# Patient Record
Sex: FEMALE | Race: WHITE | Employment: UNEMPLOYED | URBAN - NONMETROPOLITAN AREA
[De-identification: names, ages, dates, MRNs, and addresses within clinical notes are randomized per-mention and may not be internally consistent; named-entity substitution may affect disease eponyms.]

---

## 2017-12-27 ENCOUNTER — HOSPITAL ENCOUNTER (OUTPATIENT)
Age: 27
Discharge: HOME | End: 2017-12-27
Payer: COMMERCIAL

## 2017-12-27 ENCOUNTER — OFFICE VISIT (OUTPATIENT)
Dept: FAMILY MEDICINE CLINIC | Age: 27
End: 2017-12-27
Payer: COMMERCIAL

## 2017-12-27 VITALS
DIASTOLIC BLOOD PRESSURE: 87 MMHG | BODY MASS INDEX: 25.17 KG/M2 | HEIGHT: 66 IN | RESPIRATION RATE: 10 BRPM | HEART RATE: 79 BPM | SYSTOLIC BLOOD PRESSURE: 127 MMHG | OXYGEN SATURATION: 99 % | TEMPERATURE: 97.9 F | WEIGHT: 156.6 LBS

## 2017-12-27 DIAGNOSIS — G47.9 SLEEP DIFFICULTIES: ICD-10-CM

## 2017-12-27 DIAGNOSIS — R19.7 DIARRHEA DUE TO MALABSORPTION: ICD-10-CM

## 2017-12-27 DIAGNOSIS — R52 ACHES: ICD-10-CM

## 2017-12-27 DIAGNOSIS — R53.83 TIRED: Primary | ICD-10-CM

## 2017-12-27 DIAGNOSIS — R79.89 D-DIMER, ELEVATED: ICD-10-CM

## 2017-12-27 DIAGNOSIS — K90.9 DIARRHEA DUE TO MALABSORPTION: ICD-10-CM

## 2017-12-27 LAB
AVERAGE GLUCOSE: 93 MG/DL (ref 70–126)
BASOPHILS # BLD: 3.6 %
BASOPHILS ABSOLUTE: 0.2 THOU/MM3 (ref 0–0.1)
CALCIUM SERPL-MCNC: 9.8 MG/DL (ref 8.5–10.5)
D-DIMER QUANTITATIVE: 371 NG/ML FEU (ref 0–500)
EOSINOPHIL # BLD: 4.1 %
EOSINOPHILS ABSOLUTE: 0.3 THOU/MM3 (ref 0–0.4)
ESTRADIOL LEVEL: 22 PG/ML
FOLLICLE STIMULATING HORMONE: 6.8 MIU/ML (ref 0.6–16.9)
HBA1C MFR BLD: 5.1 % (ref 4.4–6.4)
HCT VFR BLD CALC: 37.5 % (ref 37–47)
HEMOGLOBIN: 12.9 GM/DL (ref 12–16)
IGE: 7 IU/ML
LUTEINIZING HORMONE: 6 MIU/ML (ref 0.6–43.9)
LYMPHOCYTES # BLD: 21.7 %
LYMPHOCYTES ABSOLUTE: 1.5 THOU/MM3 (ref 1–4.8)
MAGNESIUM: 2.1 MG/DL (ref 1.6–2.4)
MCH RBC QN AUTO: 30.3 PG (ref 27–31)
MCHC RBC AUTO-ENTMCNC: 34.3 GM/DL (ref 33–37)
MCV RBC AUTO: 88.3 FL (ref 81–99)
MONOCYTES # BLD: 5.3 %
MONOCYTES ABSOLUTE: 0.4 THOU/MM3 (ref 0.4–1.3)
NUCLEATED RED BLOOD CELLS: 0 /100 WBC
PDW BLD-RTO: 12.6 % (ref 11.5–14.5)
PLATELET # BLD: 274 THOU/MM3 (ref 130–400)
PMV BLD AUTO: 9.1 MCM (ref 7.4–10.4)
PROGESTERONE LEVEL: < 0.05 NG/ML
PTH INTACT: 39.6 PG/ML (ref 15–65)
RBC # BLD: 4.25 MILL/MM3 (ref 4.2–5.4)
RHEUMATOID FACTOR: < 10 IU/ML (ref 0–13)
SEDIMENTATION RATE, ERYTHROCYTE: 13 MM/HR (ref 0–20)
SEG NEUTROPHILS: 65.3 %
SEGMENTED NEUTROPHILS ABSOLUTE COUNT: 4.4 THOU/MM3 (ref 1.8–7.7)
T3 FREE: 3.18 PG/ML (ref 2.02–4.43)
T3 TOTAL: 88 NG/DL (ref 72–181)
THYROXINE (T4): 5.8 UG/DL (ref 4.5–12)
TSH SERPL DL<=0.05 MIU/L-ACNC: 2.45 UIU/ML (ref 0.4–4.2)
VITAMIN D 25-HYDROXY: 18 NG/ML (ref 30–100)
WBC # BLD: 6.7 THOU/MM3 (ref 4.8–10.8)

## 2017-12-27 PROCEDURE — 99204 OFFICE O/P NEW MOD 45 MIN: CPT | Performed by: FAMILY MEDICINE

## 2017-12-27 PROCEDURE — G8427 DOCREV CUR MEDS BY ELIG CLIN: HCPCS | Performed by: FAMILY MEDICINE

## 2017-12-27 PROCEDURE — G8419 CALC BMI OUT NRM PARAM NOF/U: HCPCS | Performed by: FAMILY MEDICINE

## 2017-12-27 PROCEDURE — G8484 FLU IMMUNIZE NO ADMIN: HCPCS | Performed by: FAMILY MEDICINE

## 2017-12-27 PROCEDURE — 1036F TOBACCO NON-USER: CPT | Performed by: FAMILY MEDICINE

## 2017-12-27 ASSESSMENT — PATIENT HEALTH QUESTIONNAIRE - PHQ9
SUM OF ALL RESPONSES TO PHQ9 QUESTIONS 1 & 2: 0
2. FEELING DOWN, DEPRESSED OR HOPELESS: 0
SUM OF ALL RESPONSES TO PHQ QUESTIONS 1-9: 0
1. LITTLE INTEREST OR PLEASURE IN DOING THINGS: 0

## 2017-12-27 ASSESSMENT — ENCOUNTER SYMPTOMS
ALLERGIC/IMMUNOLOGIC NEGATIVE: 1
NAUSEA: 1
ABDOMINAL PAIN: 1
RESPIRATORY NEGATIVE: 1
ABDOMINAL DISTENTION: 1
DIARRHEA: 1

## 2017-12-27 NOTE — PROGRESS NOTES
Subjective:      Patient ID: Yadira Lee is a 32 y.o. female. HPI   Yadira Lee is a 32 y.o. White female. Latonya  presents to the [address] today for   Chief Complaint   Patient presents with   David Edmondson Doctor     Wee Protocol    Insomnia     1 year ago saw nutitionist, helped a bit with all 3 phases of sleep    Joint Pain     knees, hips- since had twins; right leg and foot tingle after 20 min drive; D-dimer elevated    Diarrhea     as long as she can remember    Food Intolerance     preservatives, citric acid, additives, non GMO, organic    Fatigue    Insomnia   ,  and;   Tired    Diarrhea due to malabsorption    Sleep difficulties    Aches    D-dimer, elevated      I have reviewed Yadira Lee medical, surgical and other pertinent history in detail, and have updated medication and allergy information in the computerized patient record. Clinical Care Team:     -Referring Provider for today's consult: Self Referred  -Primary Care Provider: Marie Pham    Medical/Surgical History:   She  has a past medical history of GERD (gastroesophageal reflux disease). Her  has a past surgical history that includes Cholecystectomy and  section (2016). Family/Social History:     Her family history includes High Blood Pressure in her father; No Known Problems in her mother and sister. She  reports that she has never smoked. She has never used smokeless tobacco. She reports that she drinks alcohol. She reports that she does not use drugs. Medications/Allergies/Immunizations:     Her current medication(s) include   Current Outpatient Prescriptions:     NONFORMULARY, Cholacol- 1 tablet with every meal Muscle ezze pm- 3 tablets before bed, Disp: , Rfl:   Allergies: Septra [sulfamethoxazole-trimethoprim],  Immunizations: There is no immunization history on file for this patient.      History of Present Illness:     Nicole's had concerns including Established New Doctor [facility] Protocol); Insomnia (1 year ago saw pierreist, helped a bit with all 3 phases of sleep); Joint Pain (knees, hips- since had twins; right leg and foot tingle after 20 min drive; D-dimer elevated); Diarrhea (as long as she can remember); Food Intolerance (preservatives, citric acid, additives, non GMO, organic); Fatigue; and Insomnia. Pepito Alcantara  presents to the [address] today for;   Chief Complaint   Patient presents with   Terese Due New Doctor     Beverly Protocol    Insomnia     1 year ago saw pierreist, helped a bit with all 3 phases of sleep    Joint Pain     knees, hips- since had twins; right leg and foot tingle after 20 min drive; D-dimer elevated    Diarrhea     as long as she can remember    Food Intolerance     preservatives, citric acid, additives, non GMO, organic    Fatigue    Insomnia   , ,  abnormal labs follow up and these conditions as she  Is looking today for:     Tired    Diarrhea due to malabsorption    Sleep difficulties    Aches    D-dimer, elevated          Review of Systems   Constitutional: Positive for fatigue and unexpected weight change. HENT: Negative. Eyes: Positive for visual disturbance. Respiratory: Negative. Cardiovascular: Negative. Gastrointestinal: Positive for abdominal distention, abdominal pain, diarrhea and nausea. Endocrine: Negative. Genitourinary: Positive for frequency. Musculoskeletal: Positive for arthralgias, gait problem and neck pain. Skin: Negative. Allergic/Immunologic: Negative. Neurological: Positive for numbness. Hematological: Negative. Psychiatric/Behavioral: Positive for sleep disturbance. All other systems reviewed and are negative. Objective:   Physical Exam   Constitutional: She is oriented to person, place, and time. She appears well-developed and well-nourished. HENT:   Head: Normocephalic.    Pulmonary/Chest: Effort normal.   Neurological: She is alert and oriented to person, place, and time. Psychiatric: She has a normal mood and affect. Thought content normal.   Nursing note and vitals reviewed. Assessment:     Laboratory Data:   Lab results were searched in Care Everywhere and/or those brought by the pateint were reviewed today with Aleah Gannon and she has a copy of their most recent labs to take home with them as noted below;       Imaging Data:   Imaging Data:       Assessment & Plan:       Impression:  1. Tired    2. Diarrhea due to malabsorption    3. Sleep difficulties    4. Aches    5. D-dimer, elevated      Assessment and Plan:  After reviewing the patients chief complaints, reviewing their lab findings in great detail (with the patient and those accompanying them) which correlate to their chief complaints, symptoms, and or medical conditions; suggestions were made relating to changes in diet and or supplements which may improve the complaints and which will be reflected in their future lab findings; Chief Complaint   Patient presents with   Phone[employer]iding New Doctor     Beverly Protocol    Insomnia     1 year ago saw nutitionist, helped a bit with all 3 phases of sleep    Joint Pain     knees, hips- since had twins; right leg and foot tingle after 20 min drive; D-dimer elevated    Diarrhea     as long as she can remember    Food Intolerance     preservatives, citric acid, additives, non GMO, organic    Fatigue    Insomnia   ;    Plans for the next visits:  - Abnormal and non-optimal Labs were ordered today to be repeated in the next 120-365 days to assess changes from adjustments in nutrition and or nutrients.    - Patient instructed when having a blood draw to ask the  to divide their lab draws into multiple draws over several days if not feeling good at the time of the lab draw or if either prefers to do several smaller blood draws over several days  - Patient instructed to check with insurer before each lab draw and to go to to review at their convenience by staff with login information    - www.efaeducation. org site reviewed with the patient so they can identify better foods    - www.cornicopia. org site reviewed with the patient so they can reduce carrageenan by reviewing the carrageenan buyers guide on that site and picking safer foods    Note:   I have discussed with the patient that with all nutraceuticals, there is often mixed data and emerging research which needs to be monitored; as well as an array of NIH fact sheets on nutrients and supplements. If I have recommended cinnamon at the request of this patient to assist them in control of their blood sugar, triglyceride and or weight issues. I discussed that the patient's clinical use of cinnamon bark, calcium, magnesium, Vitamin D and pharmaceutical grade CVS #[id number] fish oil or triple-strength fish oil, and balanced B-50 or B-100 time-released B complex which does not contain Vit C in the tablet. The dose will be for a time-limited trial to determine their individual effectiveness and tolerance in this patient. I also referred the patient to the reviewing such items as consumerlabs. com NMCD: Nutrition, Metabolism, and Cardiovascular Diseases (journal) and NCAAM.gov,  Searching www.nih.gov for any concerns about long-term use and hepatotoxicity of cinnamon and other nutrients and suggest they frequently search nih.gov for the latest non-proprietary information on nutriceuticals as well as consider a subscription to [employer] for details on reviewed supplements, or at the least review the nutrient files at [facility], [address] bark, an insulin mimetic, reduces some High Carbohydrate Dietary Impacts. Methylhydroxychalcone polymers insulin-enhancing properties in fat cells are responsible for enhanced glucose uptake, inhibiting hepatic HMG-CoA reductase and lowers lipids. www.jacn. org/content/20/4/327.full     But cinnamon with additives such as Cinnamon Extract are not effective as insulin mimetics. https://www.jones.net/     Nutrients for Start up from Taplister or Headplay for ease to get started now ;  Pipe Low has some useable products;  - Triple Strength Fish Oil, enteric coated  - Vit D 3 5000 IU gel caps  - Iron ferrous sulfat 325 mg tabs  - Centrum Silver look-a-like for most patients not needing iron, or  - Centrum plain look-a-like if need iron    Local pharmacy chains such as [facility], [address] [place] [address], [address] [place] [address]. [employer];  - Triple Strength Fish Oil (enteric coated if available) or    If not enteric coated, can take from freezer for less burps  - B-50 or B-100 time released balanced B complex tabs not containing Vit C in tablet  - Cinnamon bark 500 mg (with Chromium but not extracts)   some brands list 1000 mg / serving of 2 500 mg capsules,    some brands have 1000 mg caps with the chromium but capsule size is huge  - Calcium carbonate/citrate, magnesium oxide/citrate, Vit D 3  as 3-4 tabs/caps/serving     Some Local Brands may contain Zinc which is acceptable for the first bottle or two  - Magnesium oxide 250 mg tabs for those having < 2 bowel movements daily  - Magnesium citrate TABLETS  or Slow Mag, or magnesium gluconate if having > 2 bowel movement/day  - Centrum Silver or look-a-like for most patients, Centrum plain or look-a-like with iron  - Vitamin D-3 comes as 1,000 IU or 2,000 IU or 5,000 IU gel caps or Liquid drops      Some brands containing or derived from soy oil or corn oil are OK if not allergic to soy  - Elemental Iron 65 mg tabs at bedtime is available over the counter if need more iron     Usually turns bowel movements grey, green or black but not a concern  - Apricot Kernel Oil (by Now) for dry skin and use in sensitive perineal area skin    Nutrients for ongoing use by Mail order for less expense from www.amazon. com, www.puritan.com, www.vitacost.Mobivox   - Triple Strength Fish Oil , Softgels   - B-100 time released balanced B complex   - Cinnamon bark 500 mg with or without Chromium but not extract (ineffective)  - Calcium carbonate 1000 mg, Magnesium oxide 500 mg, Vit D 3 best as individual  - Magnesium oxide 250 mg, 400 mg, or 500 mg tabs if < 2 bowel movements daily  - Multivitamin Seniors for most patients, One Daily or Item with iron  - Vit D 3  1,000IU ,   2,000 IU,  5,000 IU, can start low and buy larger on 2nd bottle     Some brands containing or derived from soy oil or corn oil are OK if not allergic to soy    Nutrients for Special Needs by Mail order for less expense;  - Elemental Iron 65 mg tabs if need more iron for low iron on labs    Usually turns bowel movements grey, green or black but not a concern  - Time released Niacin 250 mg for cold intolerance, low libido or impotence  - DHEA 10 mg, 25 mg, or 50 mg for improving DHEA levels on labs if having Fatigue    If stools too loose substitute for your Magnesium oxide using;   Magnesium citrate 200 mg tabs(NOT liquid, NOT caps) amazon. com  Magnesium gluconate 550 mg by Melanie Tovar at [facility]  Magnesium chloride foot soaks or body sprays  www.Upstarts. Mobivox   Magnesium chloride flakes for soaks, or magnesium spray or cream    Food Drink Symptom Log;  I asked this patient to track these items and any other symptoms on their list on a weekly basis to document their progress or lack of same.  This can be done on the symptom tracking sheet available to them at today's visit but looks like this:                                                      Rate on scale of 0-10 with zero = not noticeable  Symptom:                            Week 1               2                 3                 4               Etc            Hair loss    Foot cramps    Paresthesia    Aches    IBS (irritable bowel)    Constipation    Diarrhea  Nocturia    (up to bathroom at night)    Fatigue/Energy level    Stress On the other side of the sheet they can track their food, drink, environment, activity, symptoms etc      Avoiding Latex-like proteins in my foods; Avocados, Bananas, Celery, Figs & Kiwi proteins have latex-like proteins to inflame our immune systems, see the 51 latex-like foods list  How Can I Have A Latex Allergy? Eating foods with latex-like protein exposes us to latex allergies. Our body cannot tell the difference between these latex-like proteins and latex from rubber products since many people are allergic to fruit, vegetables and latex. Read labels on pre-packaged foods. This list to avoid is only a guide if you are known allergic to latex or have a latex rash on your chin, cheeks and lines on your neck and chest. The amount of latex is different in each food product or fruit variety. Foods to Avoid out of Season if not grown locally: Melon, Nectarine, Papaya, Cherry, Passion fruit, Plum, Chestnuts, and Tomato. Avocado, Banana, Celery, Figs, and Kiwi always contain Latex-like protein and are almost universally toxic    Whats in Season? Strawberries taste better in June than December because June is strawberry season so buy locally grown produce \"in season\" for the best flavor, cost and less Latex. Locally grown produce not only tastes great requires little of no ethylene exposure in food distribution so has less latex content. Out of season, use canned, frozen or dried since processed ripe and are latex lower!!!   Month     [place] Locally Grown Produce  January, February, March: use canned, frozen or dried fruits since lower in latex  April; asparagus, radishes  May; asparagus, broccoli, green onions, greens, peas, radishes, rhubarb  June; asparagus, beets, beans, broccoli, cabbage, cantaloupe, carrots, green onions, greens, lettuce, onions, parsley, peas, radishes, rhubarb, strawberries, watermelons  July; beans, beets, blueberries, broccoli, cabbage, cantaloupe, carrots, cauliflower, celery, used to hasten commercial ripening. In nature, plants produce low levels of the hormone ethylene, which regulates germination, flowering, and ripening. Forced ripening by high ethylene concentrations, plants produce allergenic wound-repair proteins, which are similar to wound-repair proteins made during the tapping of rubber trees. Sensitive individuals who ingest the fruit get a higher dose and worse reaction. Some people may even first become sensitized to latex through fruit. Can food processing increase the concentrations of allergenic proteins? Latex-sensitized children (and adults) in [place] often experience allergic reactions after eating bananas ripened artificially with ethylene. In the [place], food distribution centers treat unripe bananas and other produce with ethylene to ripen; not commonly done in [facility] since fruit is tree-ripened there. Does treatment of food with ethylene induce banana proteins that cross-react with latex? (Benjamin et al.    References:   Latex in Foods Allergy, http://ehp.niehs.nih.gov/members/2003/5811/5811.html    Search web for \" Whats in Season \" for where you live or are at the time you food shop  www.nutritioncouncil.org/pdf/healthy/SeasonalProduce. pdf ,   Management of Latex, http://medicalcenter. osu.edu/  search for latex

## 2017-12-28 LAB
C-REACTIVE PROTEIN, HIGH SENSITIVITY: 1.8 MG/L
DHEAS (DHEA SULFATE): 139 UG/DL (ref 65–380)
HOMOCYSTEINE, TOTAL: 6 UMOL/L
THYROID PEROXIDASE ANTIBODY: 0.6 IU/ML (ref 0–9)

## 2017-12-29 LAB
ANA SCREEN, REFLEXED: ABNORMAL
ANA SCREEN: DETECTED
GLIADIN PEPTIDE IGG, IGA: NORMAL

## 2017-12-30 LAB
DHEA UNCONJUGATED: 2.39 NG/ML (ref 1.33–7.78)
STRONGYLOIDES ANTIBODY: NORMAL
TESTOSTERONE, FREE W SHGB, FEMALES/CHILDREN: NORMAL

## 2017-12-31 NOTE — LETTER
[address] [address]  Phone: [phone]  Fax: [phone]    Cathy Serrato MD        December 31, 2017     [address] [place] [id number]      Dear Karma Barry:     These are your final results, so review, send questions, and come back in to see our CNP, Cierra Mendoza, or me if not clear on how to proceed on your correct path    Resulted Orders   Calcium   Result Value Ref Range    Calcium 9.8 8.5 - 10.5 mg/dL      Comment:      Performed at [address] [place] [id number]   Magnesium   Result Value Ref Range    Magnesium 2.1 1.6 - 2.4 mg/dL      Comment:      Performed at [address] [place] [address], [address]   Vitamin D 25 Hydroxy   Result Value Ref Range    Vit D, 25-Hydroxy 18 (L) 30 - 100 ng/ml      Comment:      Vitamin D Status           Range    Deficiency                 <20 ng/ml  Insuffiency                20-30 ng/ml  Sufficiency                 ng/ml  Toxicity                   >100 ng/ml  Performed at [address] [place] [address], [address]     PTH, Intact   Result Value Ref Range    Pth Intact 39.6 15.0 - 65.0 pg/mL      Comment:      Performed at [address] [place] [address], [address]   CBC Auto Differential   Result Value Ref Range    WBC 6.7 4.8 - 10.8 thou/mm3    RBC 4.25 4.20 - 5.40 mill/mm3    Hemoglobin 12.9 12.0 - 16.0 gm/dl    Hematocrit 37.5 37.0 - 47.0 %    MCV 88.3 81.0 - 99.0 fL    MCH 30.3 27.0 - 31.0 pg    MCHC 34.3 33.0 - 37.0 gm/dl    RDW 12.6 11.5 - 14.5 %    Platelets 652 883 - 418 thou/mm3    MPV 9.1 7.4 - 10.4 mcm    Seg Neutrophils 65.3 %    Lymphocytes 21.7 %    Monocytes 5.3 %    Eosinophils 4.1 %    Basophils 3.6 %    nRBC 0 /100 wbc    Segs Absolute 4.4 1.8 - 7.7 thou/mm3    Lymphocytes # 1.5 1.0 - 4.8 thou/mm3    Monocytes # 0.4 0.4 - 1.3 thou/mm3    Eosinophils # 0.3 0.0 - 0.4 thou/mm3 [facility] [address] [address] [phone]   T3   Result Value Ref Range    T3, Total 88 72 - 181 ng/dL      Comment:      Performed at [address] [place] [address]   T3, Free   Result Value Ref Range    T3, Free 3.18 2.02 - 4.43 pg/mL      Comment:      [facility] [address] [place], [place] [address] [phone]   Gliadin Antibody, IgG   Result Value Ref Range    GLIADIN PEPTIDE IGG, IGA SEE BELOW       Comment:      Deamidated Gliadin Peptide (DGP) Ab,             3     0-19          Units  INTERPRETIVE INFORMATION: Deamidated Gliadin Peptide (DGP) Ab, IgA  19 Units or less: . .......... Negative  20-30 Units: . ............... Weak Positive  31 Units or greater: . ....... Positive  Deamidated Gliadin Peptide (DGP) Ab,             4     0-19          Units  INTERPRETIVE INFORMATION: Deamidated Gliadin Peptide                            (DGP) Ab, IgG  19 Units or less: . .......... Negative  20-30 Units: . ............... Weak Positive  31 Units or greater: . ....... Positive  Performed by Myrna Dobson , [address] [facility] [address] [phone]  www. Olive Winkler MD - Lab. Director     DHEA   Result Value Ref Range    DHEA UNCONJUGATED 2.390 1.330 - 7.78 ng/mL      Comment:      INTERPRETIVE INFORMATION: Dehydroepiandrosterone, Females 18 years  and older:    Postmenopausal: 0.60-5.73 ng/mL  REFERENCE INTERVAL: Dehydroepiandrosterone by [address] [place] [address] complete set of age- and/or gender-specific reference  intervals for this test in the [facility] Test Directory  ([facility]). Test developed and characteristics determined by Russ Andersen. See Compliance Statement B: [facility]/CS  Performed by Myrna Dobson , [address] [phone]  wwwCarlos Winkler MD - Lab.  Director     DHEA-Sulfate   Result Value Ref Range    DHEAS (DHEA Sulfate) 139 65 - 380 ug/dL      Comment: REFERENCE INTERVAL: DHEAS  Access complete set of age- and/or gender-specific reference  intervals for this test in the [facility] Test Directory  ([email or web address]). Performed by Myrna Dobson [address], [address] [facility] [phone]  www. Tone Cowart MD - Lab. Director     Testosterone, Free W SHGB, Females/Child   Result Value Ref Range    TESTOSTERONE, FREE W SHGB, FEMALES/CHILDREN SEE BELOW       Comment:      Testosterone, LC-MS/MS                          14     9-55          ng/dL  Total Testosterone, Females 18 years and older   Premenopausal  9-55 ng/dL   Postmenopausal 5-32 ng/dL  REFERENCE INTERVAL: Testosterone, LC-MS/MS  Access complete set of age- and/or gender-specific reference  intervals for this test in the [facility] Test Directory  ([facility]). Test developed and characteristics determined by Russ Andersen. See Compliance Statement B: [employer] [facility]/CS  Sex Hormone Binding Globulin                    57             nmol/L  REFERENCE INTERVAL: Sex Hormone Binding Globulin  Access complete set of age- and/or gender-specific reference  intervals for this test in the [facility] Test Directory  ([facility]). Testosterone, Free LC-MS/MS                    1.7     0.8-7.4       pg/mL  To convert to pmol/L, multiply pg/mL by 3.47  The concentration of Free Testosterone is derived from a  mathematical expression based on the constant for the binding of  testoste  honey to sex hormone binding globulin. Testosterone, Free LC-MS/MS Reference Interval for Females 18  years and older   Postmenopausal: 0.6 - 3.8 pg/mL  REFERENCE INTERVAL: Testosterone, Free LC-MS/MS  Access complete set of age- and/or gender-specific reference  intervals for this test in the [facility] Test Directory  ([facility]). Test developed and characteristics determined by Russ Andersen.  See Compliance Statement B: [employer]/CS  Performed by Russ Andersen, Homocystinemia,MTHFR related Short-term Memory, Mental Cloudiness; & Dementia; For your Homocysteine:   Lab Results   Component Value Date    HOMOTOT 6 12/27/2017    You are good for this but if having issues with Short-term memory, mental cloudiness, you can use 1000 mcg of methyl B12 with 800 mcg methyl folate each morning by Jose Molina Moh. com)    To Improve Adrenal, Hormonal Support, Brain and Body Energy Levels; For your DHEA sulfate suggesting % brain energy:  Lab Results   Component Value Date    DHEAS 139 12/27/2017    DHEAUNCONJUGATED 2.390 12/27/2017     For your Testosterone suggesting % body energy:  Lab Results   Component Value Date    TESTFRFEMCHI SEE BELOW 12/27/2017   You can start (or add to) the dose of DHEA by 10 mg early each morning for the brain and/or body energy you desire as reflected in DHEA-s, DHEA, testosterone and estradiol levels. See Jacquelin Ku. com for low dose tabs    To Improve Thyroid Functions related to Grains(gluten),Carrageenan, Latex foods; For your TSH, T3,T4, and Thyroid Peroxidase(TPO) which are related to Gliadin IGA/IGG = % immune/ % bowel damage from grains, carrageenan in beer, juices and dairy products, and Latex-like proteins in foods:  Lab Results   Component Value Date    TSH 2.450 12/27/2017     Lab Results   Component Value Date    T3XUKRN 88 12/27/2017     Lab Results   Component Value Date    THYROXINE 5.8 12/27/2017     Lab Results   Component Value Date    TPO 0.6 12/27/2017     Lab Results   Component Value Date    GLIAIGGIGA SEE BELOW 12/27/2017   which are related to 3 / 4 Gliadin IGA/IGG =  10 % immune/ 13 % bowel damage from grains and carrageenan in beer, juices and dairy products , Removing grains from the diet improves thyroid gland functioning, bowel health, and several auto-immune tests results as well as preventing grain brain, wheat belly and so many more symptoms / conditions. We recommend that you repeat the above non-optimal test(s) in 3 months if not satisfied with your health at that time, as discussed at your lab review visit.     If you have any questions or concerns, please call my staff or send by [facility]The [facility]      Sincerely,        Chikis Catalan MD

## 2018-01-02 ENCOUNTER — TELEPHONE (OUTPATIENT)
Dept: FAMILY MEDICINE CLINIC | Age: 28
End: 2018-01-02

## 2018-01-02 NOTE — TELEPHONE ENCOUNTER
Called and pt states she isn't having any symptoms. Pt will call if anything changes. She voiced understanding.

## 2018-03-28 ENCOUNTER — OFFICE VISIT (OUTPATIENT)
Dept: FAMILY MEDICINE CLINIC | Age: 28
End: 2018-03-28
Payer: COMMERCIAL

## 2018-03-28 VITALS
HEIGHT: 63 IN | DIASTOLIC BLOOD PRESSURE: 82 MMHG | HEART RATE: 74 BPM | SYSTOLIC BLOOD PRESSURE: 116 MMHG | TEMPERATURE: 98 F | BODY MASS INDEX: 27.43 KG/M2 | WEIGHT: 154.8 LBS

## 2018-03-28 DIAGNOSIS — R52 ACHES: ICD-10-CM

## 2018-03-28 DIAGNOSIS — G47.9 SLEEP DIFFICULTIES: ICD-10-CM

## 2018-03-28 DIAGNOSIS — E55.9 VITAMIN D DEFICIENCY: ICD-10-CM

## 2018-03-28 DIAGNOSIS — R19.7 DIARRHEA DUE TO MALABSORPTION: ICD-10-CM

## 2018-03-28 DIAGNOSIS — R79.89 D-DIMER, ELEVATED: ICD-10-CM

## 2018-03-28 DIAGNOSIS — R53.83 TIRED: Primary | ICD-10-CM

## 2018-03-28 DIAGNOSIS — R76.8 ANA POSITIVE: ICD-10-CM

## 2018-03-28 DIAGNOSIS — K90.9 DIARRHEA DUE TO MALABSORPTION: ICD-10-CM

## 2018-03-28 DIAGNOSIS — R76.8 IGG GLIADIN ANTIBODY POSITIVE: ICD-10-CM

## 2018-03-28 LAB
BASOPHILS # BLD: 0.6 %
BASOPHILS ABSOLUTE: 0 THOU/MM3 (ref 0–0.1)
CALCIUM SERPL-MCNC: 10 MG/DL (ref 8.5–10.5)
CHOLESTEROL, TOTAL: 186 MG/DL (ref 100–199)
EOSINOPHIL # BLD: 3.6 %
EOSINOPHILS ABSOLUTE: 0.3 THOU/MM3 (ref 0–0.4)
ESTRADIOL LEVEL: 119.3 PG/ML
HCT VFR BLD CALC: 39 % (ref 37–47)
HDLC SERPL-MCNC: 66 MG/DL
HEMOGLOBIN: 13.3 GM/DL (ref 12–16)
LDL CHOLESTEROL CALCULATED: 100 MG/DL
LYMPHOCYTES # BLD: 27.9 %
LYMPHOCYTES ABSOLUTE: 2.1 THOU/MM3 (ref 1–4.8)
MAGNESIUM: 2.2 MG/DL (ref 1.6–2.4)
MCH RBC QN AUTO: 29.7 PG (ref 27–31)
MCHC RBC AUTO-ENTMCNC: 34.1 GM/DL (ref 33–37)
MCV RBC AUTO: 86.9 FL (ref 81–99)
MONOCYTES # BLD: 5.8 %
MONOCYTES ABSOLUTE: 0.4 THOU/MM3 (ref 0.4–1.3)
NUCLEATED RED BLOOD CELLS: 0 /100 WBC
PDW BLD-RTO: 12.7 % (ref 11.5–14.5)
PLATELET # BLD: 352 THOU/MM3 (ref 130–400)
PMV BLD AUTO: 9.3 FL (ref 7.4–10.4)
PROGESTERONE LEVEL: 1.42 NG/ML
PTH INTACT: 35.7 PG/ML (ref 15–65)
RBC # BLD: 4.49 MILL/MM3 (ref 4.2–5.4)
SEDIMENTATION RATE, ERYTHROCYTE: 7 MM/HR (ref 0–20)
SEG NEUTROPHILS: 62.1 %
SEGMENTED NEUTROPHILS ABSOLUTE COUNT: 4.8 THOU/MM3 (ref 1.8–7.7)
TRIGL SERPL-MCNC: 99 MG/DL (ref 0–199)
VITAMIN D 25-HYDROXY: 37 NG/ML (ref 30–100)
WBC # BLD: 7.7 THOU/MM3 (ref 4.8–10.8)

## 2018-03-28 PROCEDURE — 36415 COLL VENOUS BLD VENIPUNCTURE: CPT | Performed by: FAMILY MEDICINE

## 2018-03-28 PROCEDURE — G8419 CALC BMI OUT NRM PARAM NOF/U: HCPCS | Performed by: FAMILY MEDICINE

## 2018-03-28 PROCEDURE — 99214 OFFICE O/P EST MOD 30 MIN: CPT | Performed by: FAMILY MEDICINE

## 2018-03-28 PROCEDURE — G8484 FLU IMMUNIZE NO ADMIN: HCPCS | Performed by: FAMILY MEDICINE

## 2018-03-28 PROCEDURE — G8427 DOCREV CUR MEDS BY ELIG CLIN: HCPCS | Performed by: FAMILY MEDICINE

## 2018-03-28 PROCEDURE — 1036F TOBACCO NON-USER: CPT | Performed by: FAMILY MEDICINE

## 2018-03-28 ASSESSMENT — ENCOUNTER SYMPTOMS: COUGH: 1

## 2018-03-28 NOTE — PROGRESS NOTES
Supplements (DHEA PO), Take 10 mg by mouth every morning (before breakfast), Disp: , Rfl:   Allergies: Septra [sulfamethoxazole-trimethoprim],  Immunizations: There is no immunization history on file for this patient. History of Present Illness:     Nicole's had concerns including 3 Month Follow-Up (80% gluten down, joints and hips improve); Discuss Labs; Congestion; Cough; Insomnia (gets 4 hours; 1-11/2 hour getting to sleep, ); and Headache (and neck pain). Sravanthi Manzanares  presents to the [facility] today for;   Chief Complaint   Patient presents with    3 Month Follow-Up     80% gluten down, joints and hips improve    Discuss Labs    Congestion    Cough    Insomnia     gets 4 hours; 1-11/2 hour getting to sleep,     Headache     and neck pain   , ,  abnormal labs follow up and these conditions as she  Is looking today for:     Tired    Diarrhea due to malabsorption    Sleep difficulties    Aches    D-dimer, elevated    Vitamin D deficiency    JALEN positive    IgG Gliadin antibody positive          Review of Systems   Constitutional: Positive for fatigue. HENT: Positive for congestion. Respiratory: Positive for cough. Musculoskeletal: Positive for neck pain. Psychiatric/Behavioral: Positive for sleep disturbance. All other systems reviewed and are negative. Objective:   Physical Exam   Constitutional: She is oriented to person, place, and time. She appears well-developed and well-nourished. HENT:   Head: Normocephalic. Pulmonary/Chest: Effort normal.   Neurological: She is alert and oriented to person, place, and time. Psychiatric: She has a normal mood and affect. Thought content normal.   Nursing note and vitals reviewed.       Assessment:      Laboratory Data:   Lab results were searched in Care Everywhere and/or those brought by the pateint were reviewed today with Rosmery Gonzalez and she has a copy of their most recent labs to take home with them as noted below; burps  - B-50 or B-100 time released balanced B complex tabs not containing Vit C in tablet  - Cinnamon bark 500 mg (with Chromium but not extracts)   some brands list 1000 mg / serving of 2 500 mg capsules,    some brands have 1000 mg caps with the chromium but capsule size is huge  - Calcium carbonate/citrate, magnesium oxide/citrate, Vit D 3  as 3-4 tabs/caps/serving     Some Local Brands may contain Zinc which is acceptable for the first bottle or two  - Magnesium oxide 250 mg tabs for those having < 2 bowel movements daily  - Magnesium citrate TABLETS  or Slow Mag, or magnesium gluconate if having > 2 bowel movement/day  - Centrum Silver or look-a-like for most patients, Centrum plain or look-a-like with iron  - Vitamin D-3 comes as 1,000 IU or 2,000 IU or 5,000 IU gel caps or Liquid drops      Some brands containing or derived from soy oil or corn oil are OK if not allergic to soy  - Elemental Iron 65 mg tabs at bedtime is available over the counter if need more iron     Usually turns bowel movements grey, green or black but not a concern  - Apricot Kernel Oil (by Now) for dry skin and use in sensitive perineal area skin    Nutrients for ongoing use by Mail order for less expense from www.amazon. com, wwwMXP4, www.Lunagames   - Triple Strength Fish Oil , Softgels   - B-100 time released balanced B complex   - Cinnamon bark 500 mg with or without Chromium but not extract (ineffective)  - Calcium carbonate 1000 mg, Magnesium oxide 500 mg, Vit D 3 best as individual  - Magnesium oxide 250 mg, 400 mg, or 500 mg tabs if < 2 bowel movements daily  - Multivitamin Seniors for most patients, One Daily or Item with iron  - Vit D 3  1,000IU ,   2,000 IU,  5,000 IU, can start low and buy larger on 2nd bottle     Some brands containing or derived from soy oil or corn oil are OK if not allergic to soy    Nutrients for Special Needs by Mail order for less expense;  - Elemental Iron 65 mg tabs if need more iron for low iron on labs    Usually turns bowel movements grey, green or black but not a concern  - Time released Niacin 250 mg for cold intolerance, low libido or impotence  - DHEA 10 mg, 25 mg, or 50 mg for improving DHEA levels on labs if having Fatigue    If stools too loose substitute for your Magnesium oxide using;   Magnesium citrate 200 mg tabs(NOT liquid, NOT caps) amazon. com  Magnesium gluconate 550 mg by Guillaume at [employer]. com  Magnesium chloride foot soaks or body sprays  www.[employer]   Magnesium chloride flakes for soaks, or magnesium spray or cream    Food Drink Symptom Log;  I asked this patient to track these items and any other symptoms on their list on a weekly basis to document their progress or lack of same. This can be done on the symptom tracking sheet available to them at today's visit but looks like this:                                                      Rate on scale of 0-10 with zero = not noticeable  Symptom:                            Week 1               2                 3                 4               Etc            Hair loss    Foot cramps    Paresthesia    Aches    IBS (irritable bowel)    Constipation    Diarrhea  Nocturia    (up to bathroom at night)    Fatigue/Energy level    Stress      On the other side of the sheet they can track their food, drink, environment, activity, symptoms etc      Avoiding Latex-like proteins in my foods; Avocados, Bananas, Celery, Figs & Kiwi proteins have latex-like proteins to inflame our immune systems, see the 51 latex-like foods list  How Can I Have A Latex Allergy? Eating foods with latex-like protein exposes us to latex allergies. Our body cannot tell the difference between these latex-like proteins and latex from rubber products since many people are allergic to fruit, vegetables and latex. Read labels on pre-packaged foods.  This list to avoid is only a guide if you are known allergic to latex or have a latex rash on your chin, cheeks and lines on your neck and chest. The amount of latex is different in each food product or fruit variety. Foods to Avoid out of Season if not grown locally: Melon, Nectarine, Papaya, Cherry, Passion fruit, Plum, Chestnuts, and Tomato. Avocado, Banana, Celery, Figs, and Kiwi always contain Latex-like protein and are almost universally toxic    Whats in Season? Strawberries taste better in June than December because June is strawberry season so buy locally grown produce \"in season\" for the best flavor, cost and less Latex. Locally grown produce not only tastes great requires little of no ethylene exposure in food distribution so has less latex content. Out of season, use canned, frozen or dried since processed ripe and are latex lower!!!   Month     [place] Locally Grown Produce  January, February, March: use canned, frozen or dried fruits since lower in latex  April; asparagus, radishes  May; asparagus, broccoli, green onions, greens, peas, radishes, rhubarb  June; asparagus, beets, beans, broccoli, cabbage, cantaloupe, carrots, green onions, greens, lettuce, onions, parsley, peas, radishes, rhubarb, strawberries, watermelons  July; beans, beets, blueberries, broccoli, cabbage, cantaloupe, carrots, cauliflower, celery, cucumbers, eggplant, grapes, green onions, greens, lettuce, onions, parsley, peas, peaches, bell peppers, potatoes, radishes, summer raspberries, squash, sweet corn, tomatoes, turnips, watermelons  August; apples, beans, beets, blueberries, cabbage, cantaloupe, carrots, cauliflower, celery, cucumbers, eggplant, grapes, green onions, greens, lettuce, onions, parsley, peas, peaches, pears, bell peppers, potatoes, radishes, squash, sweet corn, tomatoes, turnips, watermelons  September; apples, beans, beets, blueberries, cabbage, cantaloupe, carrots, cauliflower, celery, cucumbers, eggplant, grapes, green onions, greens, lettuce, onions, parsley, peas, peaches, pears, bell peppers, plums, potatoes,

## 2018-03-29 LAB
C-REACTIVE PROTEIN, HIGH SENSITIVITY: 0.2 MG/L
DHEAS (DHEA SULFATE): 224 UG/DL (ref 65–380)

## 2018-03-30 LAB — GLIADIN PEPTIDE IGG, IGA: NORMAL

## 2018-03-31 LAB — ANA SCREEN: NORMAL

## 2018-04-01 LAB
DHEA UNCONJUGATED: 2.92 NG/ML (ref 1.33–7.78)
TESTOSTERONE, FREE W SHGB, FEMALES/CHILDREN: NORMAL

## 2018-09-26 ENCOUNTER — OFFICE VISIT (OUTPATIENT)
Dept: FAMILY MEDICINE CLINIC | Age: 28
End: 2018-09-26
Payer: COMMERCIAL

## 2018-09-26 VITALS
WEIGHT: 154 LBS | TEMPERATURE: 98.4 F | RESPIRATION RATE: 10 BRPM | BODY MASS INDEX: 27.29 KG/M2 | SYSTOLIC BLOOD PRESSURE: 118 MMHG | HEIGHT: 63 IN | DIASTOLIC BLOOD PRESSURE: 60 MMHG

## 2018-09-26 DIAGNOSIS — R79.89 D-DIMER, ELEVATED: ICD-10-CM

## 2018-09-26 DIAGNOSIS — G47.9 SLEEP DIFFICULTIES: ICD-10-CM

## 2018-09-26 DIAGNOSIS — R76.8 ANA POSITIVE: ICD-10-CM

## 2018-09-26 DIAGNOSIS — R53.83 TIRED: ICD-10-CM

## 2018-09-26 DIAGNOSIS — R19.7 DIARRHEA DUE TO MALABSORPTION: ICD-10-CM

## 2018-09-26 DIAGNOSIS — R76.8 IGG GLIADIN ANTIBODY POSITIVE: ICD-10-CM

## 2018-09-26 DIAGNOSIS — K90.9 DIARRHEA DUE TO MALABSORPTION: ICD-10-CM

## 2018-09-26 DIAGNOSIS — E55.9 VITAMIN D DEFICIENCY: ICD-10-CM

## 2018-09-26 DIAGNOSIS — R52 ACHES: ICD-10-CM

## 2018-09-26 PROCEDURE — 99214 OFFICE O/P EST MOD 30 MIN: CPT | Performed by: FAMILY MEDICINE

## 2018-09-26 PROCEDURE — G8427 DOCREV CUR MEDS BY ELIG CLIN: HCPCS | Performed by: FAMILY MEDICINE

## 2018-09-26 PROCEDURE — G8419 CALC BMI OUT NRM PARAM NOF/U: HCPCS | Performed by: FAMILY MEDICINE

## 2018-09-26 PROCEDURE — 1036F TOBACCO NON-USER: CPT | Performed by: FAMILY MEDICINE

## 2018-09-26 RX ORDER — CALCIUM CARBONATE 260MG(650)
1 TABLET,CHEWABLE ORAL 2 TIMES DAILY WITH MEALS
COMMUNITY
End: 2018-09-26 | Stop reason: DRUGHIGH

## 2018-09-26 ASSESSMENT — PATIENT HEALTH QUESTIONNAIRE - PHQ9
SUM OF ALL RESPONSES TO PHQ9 QUESTIONS 1 & 2: 0
1. LITTLE INTEREST OR PLEASURE IN DOING THINGS: 0
SUM OF ALL RESPONSES TO PHQ QUESTIONS 1-9: 0
SUM OF ALL RESPONSES TO PHQ QUESTIONS 1-9: 0
2. FEELING DOWN, DEPRESSED OR HOPELESS: 0

## 2018-09-26 NOTE — PROGRESS NOTES
(before breakfast), Disp: , Rfl:   Allergies: Septra [sulfamethoxazole-trimethoprim],  Immunizations: There is no immunization history on file for this patient. History of Present Illness:     Nicole's had concerns including Follow-up (wee protocal) and Fatigue (cant wake up;). Khushbu Logan  presents to the [address] today for;   Chief Complaint   Patient presents with    Follow-up     wee protocal    Fatigue     cant wake up;   , ,  abnormal labs follow up and these conditions as she  Is looking today for:     1. Tired    2. Diarrhea due to malabsorption    3. Sleep difficulties    4. Aches    5. D-dimer, elevated    6. IgG Gliadin antibody positive    7. JALEN positive    8. Vitamin D deficiency            Review of Systems   Constitutional: Positive for fatigue. Psychiatric/Behavioral: Positive for sleep disturbance. All other systems reviewed and are negative. Prior to Visit Medications    Medication Sig Taking?  Authorizing Provider   Magnesium Citrate 200 MG TABS Take 1 capsule by mouth 2 times daily (with meals) Yes Historical Provider, MD   B Complex-Biotin-FA (B COMPLEX 100 TR PO) Take by mouth daily Yes Historical Provider, MD   Cholecalciferol (VITAMIN D3 PO) Take 5,000 Units by mouth daily Yes Historical Provider, MD   Omega-3 Fatty Acids (OMEGA 3 PO) Take 1,000 mg by mouth 2 times daily (with meals) Dr. Nereida Rooney Triple Strength Yes Historical Provider, MD   Nutritional Supplements (DHEA PO) Take 10 mg by mouth every morning (before breakfast) Yes Historical Provider, MD        Allergies   Allergen Reactions    Septra [Sulfamethoxazole-Trimethoprim] Other (See Comments)     Unknown reaction       Past Medical History:   Diagnosis Date    GERD (gastroesophageal reflux disease)     dx in college       Past Surgical History:   Procedure Laterality Date     SECTION  2016    1 x, also had vaginal birth same time   [facility] CHOLECYSTECTOMY         Social History Plan:  After reviewing the patients chief complaints, reviewing their lab findings in great detail (with the patient and those accompanying them) which correlate to their chief complaints, symptoms, and or medical conditions; suggestions were made relating to changes in diet and or supplements which may improve the complaints and which will be reflected in their future lab findings; Chief Complaint   Patient presents with    Follow-up     wee protocal    Fatigue     cant wake up;   ;    Plans for the next visits:  - Abnormal and non-optimal Labs were ordered today to be repeated in the next 120-365 days to assess changes from adjustments in nutrition and or nutrients. - Patient instructed when having a blood draw to ask the  to divide their lab draws into multiple draws over several days if not feeling good at the time of the lab draw or if either prefers to do several smaller blood draws over several days  - Patient instructed to check with insurer before each lab draw and to go to the lab which the insurer directs them for the most cost effective lab draw with the least patient's cost  - Afua Mera  will be scheduled subsequent to those results. Nesha Young will bring in her drink and food log to her next visit    Chronic Problems Addressed on this Visit:                                   1.  Intensity of Service; Uncontrolled items at this visit; Chief Complaint   Patient presents with    Follow-up     wee protocal    Fatigue     cant wake up;   ;              Improved items reviewed at this visit; Stable items noted at this visit;  2. Patient's foods and drinks were reviewed with the patient,       - Afua Mera will bring food+drink symptom log to next visit for inclusion in their record      - 75 better food list reviewed & given to patient with the omega 6 food list to avoid         - Gluten in corn and oats abstracts sheet reviewed and given to the patient today   3.    Greater than about long-term use and hepatotoxicity of cinnamon and other nutrients and suggest they frequently search nih.gov for the latest non-proprietary information on nutriceuticals as well as consider a subscription to [employer] for details on reviewed supplements, or at the least review the nutrient files at [facility], [address] bark, an insulin mimetic, reduces some High Carbohydrate Dietary Impacts. Methylhydroxychalcone polymers insulin-enhancing properties in fat cells are responsible for enhanced glucose uptake, inhibiting hepatic HMG-CoA reductase and lowers lipids. www.jacn. org/content/20/4/327.full     But cinnamon with additives such as Cinnamon Extract are not effective as insulin mimetics. https://www.Parkinsor.net/     Nutrients for Start up from [employer] or [employer] for ease to get started now ;  [employer] has some useable products;  - Triple Strength Fish Oil, enteric coated  - Vit D 3 5000 IU gel caps  - Iron ferrous sulfat 325 mg tabs  - Centrum Silver look-a-like for most patients not needing iron, or  - Centrum plain look-a-like if need iron    Local pharmacy chains such as [facility], [address] [place] [address], [facility].  [employer];  - Triple Strength Fish Oil (enteric coated if available) or    If not enteric coated, can take from freezer for less burps  - B-50 or B-100 time released balanced B complex tabs not containing Vit C in tablet  - Cinnamon bark 500 mg (with Chromium but not extracts)   some brands list 1000 mg / serving of 2 500 mg capsules,    some brands have 1000 mg caps with the chromium but capsule size is huge  - Calcium carbonate/citrate, magnesium oxide/citrate, Vit D 3  as 3-4 tabs/caps/serving     Some Local Brands may contain Zinc which is acceptable for the first bottle or two  - Magnesium oxide 250 mg tabs for those having < 2 bowel movements daily  - Magnesium citrate TABLETS  or Slow Mag, or magnesium gluconate if having > 2 bowel movement/day  - Centrum Silver or look-a-like for most patients, Centrum plain or look-a-like with iron  - Vitamin D-3 comes as 1,000 IU or 2,000 IU or 5,000 IU gel caps or Liquid drops      Some brands containing or derived from soy oil or corn oil are OK if not allergic to soy  - Elemental Iron 65 mg tabs at bedtime is available over the counter if need more iron     Usually turns bowel movements grey, green or black but not a concern  - Apricot Kernel Oil (by Now) for dry skin and use in sensitive perineal area skin    Nutrients for ongoing use by Mail order for less expense from www.amazon. com, wwwMy1login, wwwRevolights   - Triple Strength Fish Oil , Softgels   - B-100 time released balanced B complex   - Cinnamon bark 500 mg with or without Chromium but not extract (ineffective)  - Calcium carbonate 1000 mg, Magnesium oxide 500 mg, Vit D 3 best as individual  - Magnesium oxide 250 mg, 400 mg, or 500 mg tabs if < 2 bowel movements daily  - Multivitamin Seniors for most patients, One Daily or Item with iron  - Vit D 3  1,000IU ,   2,000 IU,  5,000 IU, can start low and buy larger on 2nd bottle     Some brands containing or derived from soy oil or corn oil are OK if not allergic to soy    Nutrients for Special Needs by Mail order for less expense;  - Elemental Iron 65 mg tabs if need more iron for low iron on labs    Usually turns bowel movements grey, green or black but not a concern  - Time released Niacin 250 mg for cold intolerance, low libido or impotence  - DHEA 10 mg, 25 mg, or 50 mg for improving DHEA levels on labs if having Fatigue    If stools too loose substitute for your Magnesium oxide using;   Magnesium citrate 200 mg tabs(NOT liquid, NOT caps) amazon. com  Magnesium gluconate 550 mg by [employer] at [employer]  Magnesium chloride foot soaks or body sprays  www.[employer]nexuss. [employer]   Magnesium chloride flakes for soaks, or magnesium spray or cream    Food contain the same proteins that are allergens in latex. People with fruit allergies should warn physicians before undergoing procedures which may cause anaphylactic reaction if in contact with latex gloves. Some of the common foods with defined cross-reactivity to latex are avocado, banana, kiwi, chestnut, raw potato, tomato, stone fruits (e.g., peach, cherry), hazelnut, melons, celery, carrot, apple, pear, papaya, and almond. Foods with less well-defined cross-reactivity to latex are peanuts, peppers, citrus fruits, coconut, pineapple, suresh, fig, passion fruit, Ugli fruit, and grape    This fruit/latex cross-reactivity is worsened by ethylene, a gas used to hasten commercial ripening. In nature, plants produce low levels of the hormone ethylene, which regulates germination, flowering, and ripening. Forced ripening by high ethylene concentrations, plants produce allergenic wound-repair proteins, which are similar to wound-repair proteins made during the tapping of rubber trees. Sensitive individuals who ingest the fruit get a higher dose and worse reaction. Some people may even first become sensitized to latex through fruit. Can food processing increase the concentrations of allergenic proteins? Latex-sensitized children (and adults) in [place] often experience allergic reactions after eating bananas ripened artificially with ethylene. In the [place], food distribution centers treat unripe bananas and other produce with ethylene to ripen; not commonly done in [facility] since fruit is tree-ripened there. Does treatment of food with ethylene induce banana proteins that cross-react with latex? (Benjamin et al.    References:   Latex in Foods Allergy, http://ehp.niehs.nih.gov/members/2003/5811/5811.html    Search web for \" Whats in Season \" for where you live or are at the time you food shop  www.nutritioncouncil.org/pdf/healthy/SeasonalProduce. pdf ,   Management of Latex,

## 2018-12-10 ENCOUNTER — HOSPITAL ENCOUNTER (OUTPATIENT)
Age: 28
Discharge: HOME | End: 2018-12-10
Payer: COMMERCIAL

## 2018-12-10 DIAGNOSIS — R76.8 IGG GLIADIN ANTIBODY POSITIVE: ICD-10-CM

## 2018-12-10 DIAGNOSIS — R79.89 D-DIMER, ELEVATED: ICD-10-CM

## 2018-12-10 DIAGNOSIS — E55.9 VITAMIN D DEFICIENCY: ICD-10-CM

## 2018-12-10 DIAGNOSIS — R53.83 TIRED: ICD-10-CM

## 2018-12-10 DIAGNOSIS — G47.9 SLEEP DIFFICULTIES: ICD-10-CM

## 2018-12-10 DIAGNOSIS — K90.9 DIARRHEA DUE TO MALABSORPTION: ICD-10-CM

## 2018-12-10 DIAGNOSIS — R76.8 ANA POSITIVE: ICD-10-CM

## 2018-12-10 DIAGNOSIS — R52 ACHES: ICD-10-CM

## 2018-12-10 DIAGNOSIS — R19.7 DIARRHEA DUE TO MALABSORPTION: ICD-10-CM

## 2018-12-10 LAB
AVERAGE GLUCOSE: 93 MG/DL (ref 70–126)
BASOPHILS # BLD: 1.2 %
BASOPHILS ABSOLUTE: 0.1 THOU/MM3 (ref 0–0.1)
CALCIUM SERPL-MCNC: 9.4 MG/DL (ref 8.5–10.5)
EOSINOPHIL # BLD: 5.7 %
EOSINOPHILS ABSOLUTE: 0.3 THOU/MM3 (ref 0–0.4)
ERYTHROCYTE [DISTWIDTH] IN BLOOD BY AUTOMATED COUNT: 12.2 % (ref 11.5–14.5)
ERYTHROCYTE [DISTWIDTH] IN BLOOD BY AUTOMATED COUNT: 41.2 FL (ref 35–45)
ESTRADIOL LEVEL: 73.6 PG/ML
HBA1C MFR BLD: 5.1 % (ref 4.4–6.4)
HCT VFR BLD CALC: 37.1 % (ref 37–47)
HEMOGLOBIN: 12.1 GM/DL (ref 12–16)
IMMATURE GRANS (ABS): 0.01 THOU/MM3 (ref 0–0.07)
IMMATURE GRANULOCYTES: 0.2 %
LYMPHOCYTES # BLD: 32.1 %
LYMPHOCYTES ABSOLUTE: 1.9 THOU/MM3 (ref 1–4.8)
MAGNESIUM: 2 MG/DL (ref 1.6–2.4)
MCH RBC QN AUTO: 30.2 PG (ref 26–33)
MCHC RBC AUTO-ENTMCNC: 32.6 GM/DL (ref 32.2–35.5)
MCV RBC AUTO: 92.5 FL (ref 81–99)
MONOCYTES # BLD: 6.6 %
MONOCYTES ABSOLUTE: 0.4 THOU/MM3 (ref 0.4–1.3)
NUCLEATED RED BLOOD CELLS: 0 /100 WBC
PLATELET # BLD: 260 THOU/MM3 (ref 130–400)
PMV BLD AUTO: 11.3 FL (ref 9.4–12.4)
PROGESTERONE LEVEL: 0.07 NG/ML
PTH INTACT: 44 PG/ML (ref 15–65)
RBC # BLD: 4.01 MILL/MM3 (ref 4.2–5.4)
SEDIMENTATION RATE, ERYTHROCYTE: 11 MM/HR (ref 0–20)
SEG NEUTROPHILS: 54.2 %
SEGMENTED NEUTROPHILS ABSOLUTE COUNT: 3.1 THOU/MM3 (ref 1.8–7.7)
T3 TOTAL: 93 NG/DL (ref 72–181)
TSH SERPL DL<=0.05 MIU/L-ACNC: 2.46 UIU/ML (ref 0.4–4.2)
VITAMIN D 25-HYDROXY: 45 NG/ML (ref 30–100)
WBC # BLD: 5.8 THOU/MM3 (ref 4.8–10.8)

## 2018-12-10 PROCEDURE — 83735 ASSAY OF MAGNESIUM: CPT

## 2018-12-10 PROCEDURE — 85025 COMPLETE CBC W/AUTO DIFF WBC: CPT

## 2018-12-10 PROCEDURE — 82627 DEHYDROEPIANDROSTERONE: CPT

## 2018-12-10 PROCEDURE — 84443 ASSAY THYROID STIM HORMONE: CPT

## 2018-12-10 PROCEDURE — 83516 IMMUNOASSAY NONANTIBODY: CPT

## 2018-12-10 PROCEDURE — 82306 VITAMIN D 25 HYDROXY: CPT

## 2018-12-10 PROCEDURE — 82670 ASSAY OF TOTAL ESTRADIOL: CPT

## 2018-12-10 PROCEDURE — 84480 ASSAY TRIIODOTHYRONINE (T3): CPT

## 2018-12-10 PROCEDURE — 84403 ASSAY OF TOTAL TESTOSTERONE: CPT

## 2018-12-10 PROCEDURE — 82626 DEHYDROEPIANDROSTERONE: CPT

## 2018-12-10 PROCEDURE — 86038 ANTINUCLEAR ANTIBODIES: CPT

## 2018-12-10 PROCEDURE — 36415 COLL VENOUS BLD VENIPUNCTURE: CPT

## 2018-12-10 PROCEDURE — 84270 ASSAY OF SEX HORMONE GLOBUL: CPT

## 2018-12-10 PROCEDURE — 84436 ASSAY OF TOTAL THYROXINE: CPT

## 2018-12-10 PROCEDURE — 83036 HEMOGLOBIN GLYCOSYLATED A1C: CPT

## 2018-12-10 PROCEDURE — 85651 RBC SED RATE NONAUTOMATED: CPT

## 2018-12-10 PROCEDURE — 86376 MICROSOMAL ANTIBODY EACH: CPT

## 2018-12-10 PROCEDURE — 84144 ASSAY OF PROGESTERONE: CPT

## 2018-12-10 PROCEDURE — 83090 ASSAY OF HOMOCYSTEINE: CPT

## 2018-12-10 PROCEDURE — 86141 C-REACTIVE PROTEIN HS: CPT

## 2018-12-10 PROCEDURE — 82310 ASSAY OF CALCIUM: CPT

## 2018-12-10 PROCEDURE — 83970 ASSAY OF PARATHORMONE: CPT

## 2018-12-11 LAB
C-REACTIVE PROTEIN, HIGH SENSITIVITY: 0.4 MG/L
DHEAS (DHEA SULFATE): 134 UG/DL (ref 45–270)
GLIADIN PEPTIDE IGG, IGA: NORMAL
HOMOCYSTEINE, TOTAL: 5 UMOL/L
THYROID PEROXIDASE ANTIBODY: 0.8 IU/ML (ref 0–9)
THYROXINE (T4): 5.1 UG/DL (ref 4.5–12)

## 2018-12-12 LAB — ANA SCREEN: NORMAL

## 2018-12-13 LAB
DHEA UNCONJUGATED: 2.65 NG/ML (ref 1.33–7.78)
TESTOSTERONE, FREE W SHGB, FEMALES/CHILDREN: NORMAL

## 2018-12-21 ENCOUNTER — OFFICE VISIT (OUTPATIENT)
Dept: FAMILY MEDICINE CLINIC | Age: 28
End: 2018-12-21
Payer: COMMERCIAL

## 2018-12-21 VITALS
TEMPERATURE: 98.9 F | DIASTOLIC BLOOD PRESSURE: 68 MMHG | BODY MASS INDEX: 28.77 KG/M2 | RESPIRATION RATE: 10 BRPM | HEIGHT: 63 IN | WEIGHT: 162.4 LBS | SYSTOLIC BLOOD PRESSURE: 108 MMHG

## 2018-12-21 DIAGNOSIS — R76.8 ANA POSITIVE: ICD-10-CM

## 2018-12-21 DIAGNOSIS — R79.89 D-DIMER, ELEVATED: ICD-10-CM

## 2018-12-21 DIAGNOSIS — G47.9 SLEEP DIFFICULTIES: ICD-10-CM

## 2018-12-21 DIAGNOSIS — R76.8 IGG GLIADIN ANTIBODY POSITIVE: ICD-10-CM

## 2018-12-21 DIAGNOSIS — K90.9 DIARRHEA DUE TO MALABSORPTION: ICD-10-CM

## 2018-12-21 DIAGNOSIS — E55.9 VITAMIN D DEFICIENCY: ICD-10-CM

## 2018-12-21 DIAGNOSIS — R52 ACHES: ICD-10-CM

## 2018-12-21 DIAGNOSIS — R19.7 DIARRHEA DUE TO MALABSORPTION: ICD-10-CM

## 2018-12-21 DIAGNOSIS — R53.83 TIRED: ICD-10-CM

## 2018-12-21 PROCEDURE — 99214 OFFICE O/P EST MOD 30 MIN: CPT | Performed by: FAMILY MEDICINE

## 2018-12-21 PROCEDURE — 1036F TOBACCO NON-USER: CPT | Performed by: FAMILY MEDICINE

## 2018-12-21 PROCEDURE — G8427 DOCREV CUR MEDS BY ELIG CLIN: HCPCS | Performed by: FAMILY MEDICINE

## 2018-12-21 PROCEDURE — G8419 CALC BMI OUT NRM PARAM NOF/U: HCPCS | Performed by: FAMILY MEDICINE

## 2018-12-21 PROCEDURE — G8484 FLU IMMUNIZE NO ADMIN: HCPCS | Performed by: FAMILY MEDICINE

## 2018-12-21 ASSESSMENT — ENCOUNTER SYMPTOMS: CHEST TIGHTNESS: 1

## 2018-12-21 NOTE — PROGRESS NOTES
If I have recommended cinnamon at the request of this patient to assist them in control of their blood sugar, triglyceride and or weight issues. I discussed that the patient's clinical use of cinnamon bark, calcium, magnesium, Vitamin D and pharmaceutical grade [facility]S #[id number] fish oil or triple-strength fish oil, and balanced B-50 or B-100 time-released B complex which does not contain Vit C in the tablet. The dose will be for a time-limited trial to determine their individual effectiveness and tolerance in this patient. I also referred the patient to the reviewing such items as consumerlabs. com NMCD: Nutrition, Metabolism, and Cardiovascular Diseases (journal) and NCAAM.gov,  Searching www.nih.gov for any concerns about long-term use and hepatotoxicity of cinnamon and other nutrients and suggest they frequently search AbraResto.gov for the latest non-proprietary information on nutriceuticals as well as consider a subscription to [employer] for details on reviewed supplements, or at the least review the nutrient files at [facility], [address] bark, an insulin mimetic, reduces some High Carbohydrate Dietary Impacts. Methylhydroxychalcone polymers insulin-enhancing properties in fat cells are responsible for enhanced glucose uptake, inhibiting hepatic HMG-CoA reductase and lowers lipids. www.jacn. org/content/20/4/327.full     But cinnamon with additives such as Cinnamon Extract are not effective as insulin mimetics.  https://www.jones.net/     Nutrients for Start up from [facility] or [employer] for ease to get started now ;  [employer] has some useable products;  - Triple Strength Fish Oil, enteric coated  - Vit D 3 5000 IU gel caps  - Iron ferrous sulfat 325 mg tabs  - Centrum Silver look-a-like for most patients not needing iron, or  - Centrum plain look-a-like if need iron    Local pharmacy chains such as [facility], [facility],